# Patient Record
Sex: FEMALE | Race: WHITE | Employment: OTHER | URBAN - METROPOLITAN AREA
[De-identification: names, ages, dates, MRNs, and addresses within clinical notes are randomized per-mention and may not be internally consistent; named-entity substitution may affect disease eponyms.]

---

## 2018-08-07 ENCOUNTER — HOSPITAL ENCOUNTER (INPATIENT)
Age: 83
LOS: 1 days | Discharge: NURSING, REHAB OR LONG-TERM CARE | DRG: 641 | End: 2018-08-09
Attending: EMERGENCY MEDICINE | Admitting: INTERNAL MEDICINE
Payer: MEDICARE

## 2018-08-07 ENCOUNTER — APPOINTMENT (OUTPATIENT)
Dept: GENERAL RADIOLOGY | Age: 83
DRG: 641 | End: 2018-08-07
Payer: MEDICARE

## 2018-08-07 ENCOUNTER — APPOINTMENT (OUTPATIENT)
Dept: CT IMAGING | Age: 83
DRG: 641 | End: 2018-08-07
Payer: MEDICARE

## 2018-08-07 ENCOUNTER — APPOINTMENT (OUTPATIENT)
Dept: ULTRASOUND IMAGING | Age: 83
DRG: 641 | End: 2018-08-07
Payer: MEDICARE

## 2018-08-07 DIAGNOSIS — R41.82 ALTERED MENTAL STATUS, UNSPECIFIED ALTERED MENTAL STATUS TYPE: Primary | ICD-10-CM

## 2018-08-07 DIAGNOSIS — E86.0 DEHYDRATION: ICD-10-CM

## 2018-08-07 LAB
ALBUMIN SERPL-MCNC: 3 G/DL (ref 3.5–5.2)
ALP BLD-CCNC: 458 U/L (ref 35–104)
ALT SERPL-CCNC: 57 U/L (ref 0–32)
ANION GAP SERPL CALCULATED.3IONS-SCNC: 8 MMOL/L (ref 7–16)
AST SERPL-CCNC: 77 U/L (ref 0–31)
BASOPHILS ABSOLUTE: 0.03 E9/L (ref 0–0.2)
BASOPHILS RELATIVE PERCENT: 0.5 % (ref 0–2)
BILIRUB SERPL-MCNC: 0.8 MG/DL (ref 0–1.2)
BUN BLDV-MCNC: 27 MG/DL (ref 8–23)
CALCIUM SERPL-MCNC: 9.5 MG/DL (ref 8.6–10.2)
CHLORIDE BLD-SCNC: 104 MMOL/L (ref 98–107)
CO2: 28 MMOL/L (ref 22–29)
CREAT SERPL-MCNC: 0.5 MG/DL (ref 0.5–1)
EOSINOPHILS ABSOLUTE: 0.15 E9/L (ref 0.05–0.5)
EOSINOPHILS RELATIVE PERCENT: 2.7 % (ref 0–6)
GFR AFRICAN AMERICAN: >60
GFR NON-AFRICAN AMERICAN: >60 ML/MIN/1.73
GLUCOSE BLD-MCNC: 122 MG/DL (ref 74–109)
HCT VFR BLD CALC: 35 % (ref 34–48)
HEMOGLOBIN: 11.6 G/DL (ref 11.5–15.5)
IMMATURE GRANULOCYTES #: 0.03 E9/L
IMMATURE GRANULOCYTES %: 0.5 % (ref 0–5)
LYMPHOCYTES ABSOLUTE: 1.59 E9/L (ref 1.5–4)
LYMPHOCYTES RELATIVE PERCENT: 28.4 % (ref 20–42)
MAGNESIUM: 1.7 MG/DL (ref 1.6–2.6)
MCH RBC QN AUTO: 32.8 PG (ref 26–35)
MCHC RBC AUTO-ENTMCNC: 33.1 % (ref 32–34.5)
MCV RBC AUTO: 98.9 FL (ref 80–99.9)
MONOCYTES ABSOLUTE: 0.8 E9/L (ref 0.1–0.95)
MONOCYTES RELATIVE PERCENT: 14.3 % (ref 2–12)
NEUTROPHILS ABSOLUTE: 3 E9/L (ref 1.8–7.3)
NEUTROPHILS RELATIVE PERCENT: 53.6 % (ref 43–80)
PDW BLD-RTO: 13.5 FL (ref 11.5–15)
PLATELET # BLD: 118 E9/L (ref 130–450)
PMV BLD AUTO: 11 FL (ref 7–12)
POTASSIUM SERPL-SCNC: 4.2 MMOL/L (ref 3.5–5)
RBC # BLD: 3.54 E12/L (ref 3.5–5.5)
SODIUM BLD-SCNC: 140 MMOL/L (ref 132–146)
TOTAL PROTEIN: 6.7 G/DL (ref 6.4–8.3)
TROPONIN: <0.01 NG/ML (ref 0–0.03)
WBC # BLD: 5.6 E9/L (ref 4.5–11.5)

## 2018-08-07 PROCEDURE — 80053 COMPREHEN METABOLIC PANEL: CPT

## 2018-08-07 PROCEDURE — 76705 ECHO EXAM OF ABDOMEN: CPT

## 2018-08-07 PROCEDURE — 85025 COMPLETE CBC W/AUTO DIFF WBC: CPT

## 2018-08-07 PROCEDURE — 70450 CT HEAD/BRAIN W/O DYE: CPT

## 2018-08-07 PROCEDURE — 99285 EMERGENCY DEPT VISIT HI MDM: CPT

## 2018-08-07 PROCEDURE — 81001 URINALYSIS AUTO W/SCOPE: CPT

## 2018-08-07 PROCEDURE — 83735 ASSAY OF MAGNESIUM: CPT

## 2018-08-07 PROCEDURE — 6360000004 HC RX CONTRAST MEDICATION: Performed by: RADIOLOGY

## 2018-08-07 PROCEDURE — 84484 ASSAY OF TROPONIN QUANT: CPT

## 2018-08-07 PROCEDURE — 71045 X-RAY EXAM CHEST 1 VIEW: CPT

## 2018-08-07 PROCEDURE — 74177 CT ABD & PELVIS W/CONTRAST: CPT

## 2018-08-07 PROCEDURE — 2580000003 HC RX 258: Performed by: STUDENT IN AN ORGANIZED HEALTH CARE EDUCATION/TRAINING PROGRAM

## 2018-08-07 RX ORDER — 0.9 % SODIUM CHLORIDE 0.9 %
1000 INTRAVENOUS SOLUTION INTRAVENOUS ONCE
Status: COMPLETED | OUTPATIENT
Start: 2018-08-07 | End: 2018-08-07

## 2018-08-07 RX ADMIN — SODIUM CHLORIDE 1000 ML: 9 INJECTION, SOLUTION INTRAVENOUS at 22:10

## 2018-08-07 RX ADMIN — IOPAMIDOL 100 ML: 755 INJECTION, SOLUTION INTRAVENOUS at 23:25

## 2018-08-07 ASSESSMENT — ENCOUNTER SYMPTOMS
SORE THROAT: 0
DIARRHEA: 0
VISUAL CHANGE: 0
BACK PAIN: 0
EYE DEVIATION: 0
EYE REDNESS: 0
SHORTNESS OF BREATH: 0
TROUBLE SWALLOWING: 0
VOMITING: 0
COUGH: 0
ABDOMINAL PAIN: 0
DIFFICULTY BREATHING: 0
NAUSEA: 0

## 2018-08-08 PROBLEM — Z66 DNR (DO NOT RESUSCITATE): Status: ACTIVE | Noted: 2018-08-08

## 2018-08-08 PROBLEM — E86.0 DEHYDRATION: Status: ACTIVE | Noted: 2018-08-08

## 2018-08-08 PROBLEM — R74.8 BLOOD ALKALINE PHOSPHATASE INCREASED COMPARED WITH PRIOR MEASUREMENT: Status: ACTIVE | Noted: 2018-08-08

## 2018-08-08 PROBLEM — F03.90 DEMENTIA (HCC): Status: ACTIVE | Noted: 2018-08-08

## 2018-08-08 PROBLEM — R41.82 ALTERED MENTAL STATUS: Status: ACTIVE | Noted: 2018-08-08

## 2018-08-08 LAB
BACTERIA: ABNORMAL /HPF
BILIRUBIN URINE: NEGATIVE
BLOOD, URINE: ABNORMAL
CLARITY: CLEAR
COLOR: YELLOW
EKG ATRIAL RATE: 89 BPM
EKG P AXIS: 77 DEGREES
EKG P-R INTERVAL: 170 MS
EKG Q-T INTERVAL: 388 MS
EKG QRS DURATION: 82 MS
EKG QTC CALCULATION (BAZETT): 472 MS
EKG R AXIS: 65 DEGREES
EKG T AXIS: 75 DEGREES
EKG VENTRICULAR RATE: 89 BPM
EPITHELIAL CELLS, UA: ABNORMAL /HPF
GLUCOSE URINE: NEGATIVE MG/DL
KETONES, URINE: NEGATIVE MG/DL
LEUKOCYTE ESTERASE, URINE: NEGATIVE
NITRITE, URINE: NEGATIVE
PH UA: 7 (ref 5–9)
PROTEIN UA: NEGATIVE MG/DL
RBC UA: ABNORMAL /HPF (ref 0–2)
SPECIFIC GRAVITY UA: 1.01 (ref 1–1.03)
UROBILINOGEN, URINE: 0.2 E.U./DL
WBC UA: ABNORMAL /HPF (ref 0–5)

## 2018-08-08 PROCEDURE — G8979 MOBILITY GOAL STATUS: HCPCS

## 2018-08-08 PROCEDURE — G0378 HOSPITAL OBSERVATION PER HR: HCPCS

## 2018-08-08 PROCEDURE — 2580000003 HC RX 258: Performed by: STUDENT IN AN ORGANIZED HEALTH CARE EDUCATION/TRAINING PROGRAM

## 2018-08-08 PROCEDURE — 97161 PT EVAL LOW COMPLEX 20 MIN: CPT

## 2018-08-08 PROCEDURE — 96372 THER/PROPH/DIAG INJ SC/IM: CPT

## 2018-08-08 PROCEDURE — APPSS15 APP SPLIT SHARED TIME 0-15 MINUTES: Performed by: NURSE PRACTITIONER

## 2018-08-08 PROCEDURE — 97165 OT EVAL LOW COMPLEX 30 MIN: CPT

## 2018-08-08 PROCEDURE — G8988 SELF CARE GOAL STATUS: HCPCS

## 2018-08-08 PROCEDURE — G8978 MOBILITY CURRENT STATUS: HCPCS

## 2018-08-08 PROCEDURE — 99999 PR OFFICE/OUTPT VISIT,PROCEDURE ONLY: CPT | Performed by: INTERNAL MEDICINE

## 2018-08-08 PROCEDURE — 6360000002 HC RX W HCPCS: Performed by: INTERNAL MEDICINE

## 2018-08-08 PROCEDURE — 2580000003 HC RX 258: Performed by: INTERNAL MEDICINE

## 2018-08-08 PROCEDURE — 99220 PR INITIAL OBSERVATION CARE/DAY 70 MINUTES: CPT | Performed by: INTERNAL MEDICINE

## 2018-08-08 PROCEDURE — G8987 SELF CARE CURRENT STATUS: HCPCS

## 2018-08-08 PROCEDURE — 6370000000 HC RX 637 (ALT 250 FOR IP): Performed by: INTERNAL MEDICINE

## 2018-08-08 RX ORDER — POLYETHYLENE GLYCOL 3350 17 G/17G
17 POWDER, FOR SOLUTION ORAL DAILY PRN
COMMUNITY

## 2018-08-08 RX ORDER — CALCITONIN SALMON 200 [IU]/.09ML
1 SPRAY, METERED NASAL DAILY
Status: ON HOLD | COMMUNITY
End: 2018-08-09 | Stop reason: HOSPADM

## 2018-08-08 RX ORDER — BUDESONIDE AND FORMOTEROL FUMARATE DIHYDRATE 80; 4.5 UG/1; UG/1
2 AEROSOL RESPIRATORY (INHALATION) 2 TIMES DAILY
COMMUNITY

## 2018-08-08 RX ORDER — ESCITALOPRAM OXALATE 10 MG/1
10 TABLET ORAL DAILY
COMMUNITY

## 2018-08-08 RX ORDER — ONDANSETRON 2 MG/ML
4 INJECTION INTRAMUSCULAR; INTRAVENOUS EVERY 6 HOURS PRN
Status: DISCONTINUED | OUTPATIENT
Start: 2018-08-08 | End: 2018-08-09 | Stop reason: HOSPADM

## 2018-08-08 RX ORDER — IPRATROPIUM BROMIDE AND ALBUTEROL SULFATE 2.5; .5 MG/3ML; MG/3ML
1 SOLUTION RESPIRATORY (INHALATION) EVERY 6 HOURS PRN
Status: DISCONTINUED | OUTPATIENT
Start: 2018-08-08 | End: 2018-08-09 | Stop reason: HOSPADM

## 2018-08-08 RX ORDER — CALCITONIN SALMON 200 [IU]/.09ML
1 SPRAY, METERED NASAL DAILY
Status: DISCONTINUED | OUTPATIENT
Start: 2018-08-08 | End: 2018-08-08 | Stop reason: CLARIF

## 2018-08-08 RX ORDER — MULTIVIT-MIN/IRON FUM/FOLIC AC 7.5 MG-4
1 TABLET ORAL DAILY
COMMUNITY

## 2018-08-08 RX ORDER — IPRATROPIUM BROMIDE AND ALBUTEROL SULFATE 2.5; .5 MG/3ML; MG/3ML
1 SOLUTION RESPIRATORY (INHALATION) EVERY 6 HOURS PRN
COMMUNITY

## 2018-08-08 RX ORDER — OXYCODONE HYDROCHLORIDE 5 MG/1
5 TABLET ORAL EVERY 8 HOURS PRN
COMMUNITY

## 2018-08-08 RX ORDER — OXYCODONE HYDROCHLORIDE 5 MG/1
5 TABLET ORAL EVERY 6 HOURS PRN
Status: DISCONTINUED | OUTPATIENT
Start: 2018-08-08 | End: 2018-08-09 | Stop reason: HOSPADM

## 2018-08-08 RX ORDER — DOCUSATE SODIUM 100 MG/1
100 CAPSULE, LIQUID FILLED ORAL 2 TIMES DAILY
Status: DISCONTINUED | OUTPATIENT
Start: 2018-08-08 | End: 2018-08-09 | Stop reason: HOSPADM

## 2018-08-08 RX ORDER — SODIUM PHOSPHATE, DIBASIC AND SODIUM PHOSPHATE, MONOBASIC 7; 19 G/133ML; G/133ML
1 ENEMA RECTAL DAILY PRN
COMMUNITY

## 2018-08-08 RX ORDER — 0.9 % SODIUM CHLORIDE 0.9 %
30 INTRAVENOUS SOLUTION INTRAVENOUS ONCE
Status: COMPLETED | OUTPATIENT
Start: 2018-08-08 | End: 2018-08-08

## 2018-08-08 RX ORDER — BISACODYL 10 MG
10 SUPPOSITORY, RECTAL RECTAL DAILY PRN
COMMUNITY

## 2018-08-08 RX ORDER — SODIUM CHLORIDE 0.9 % (FLUSH) 0.9 %
10 SYRINGE (ML) INJECTION PRN
Status: DISCONTINUED | OUTPATIENT
Start: 2018-08-08 | End: 2018-08-09 | Stop reason: HOSPADM

## 2018-08-08 RX ORDER — SODIUM CHLORIDE 0.9 % (FLUSH) 0.9 %
10 SYRINGE (ML) INJECTION EVERY 12 HOURS SCHEDULED
Status: DISCONTINUED | OUTPATIENT
Start: 2018-08-08 | End: 2018-08-09 | Stop reason: HOSPADM

## 2018-08-08 RX ORDER — ACETAMINOPHEN 160 MG
1 TABLET,DISINTEGRATING ORAL DAILY
COMMUNITY

## 2018-08-08 RX ORDER — SPIRONOLACTONE 25 MG/1
25 TABLET ORAL DAILY
COMMUNITY

## 2018-08-08 RX ORDER — SPIRONOLACTONE 25 MG/1
25 TABLET ORAL DAILY
Status: DISCONTINUED | OUTPATIENT
Start: 2018-08-08 | End: 2018-08-08

## 2018-08-08 RX ORDER — CHOLECALCIFEROL (VITAMIN D3) 50 MCG
2000 TABLET ORAL DAILY
Status: DISCONTINUED | OUTPATIENT
Start: 2018-08-08 | End: 2018-08-09 | Stop reason: HOSPADM

## 2018-08-08 RX ORDER — ESCITALOPRAM OXALATE 10 MG/1
10 TABLET ORAL DAILY
Status: DISCONTINUED | OUTPATIENT
Start: 2018-08-08 | End: 2018-08-09 | Stop reason: HOSPADM

## 2018-08-08 RX ORDER — PROPRANOLOL HYDROCHLORIDE 20 MG/1
20 TABLET ORAL 2 TIMES DAILY
Status: DISCONTINUED | OUTPATIENT
Start: 2018-08-08 | End: 2018-08-09 | Stop reason: HOSPADM

## 2018-08-08 RX ORDER — PROPRANOLOL HYDROCHLORIDE 20 MG/1
20 TABLET ORAL 2 TIMES DAILY
COMMUNITY

## 2018-08-08 RX ORDER — SODIUM CHLORIDE 9 MG/ML
INJECTION, SOLUTION INTRAVENOUS CONTINUOUS
Status: DISCONTINUED | OUTPATIENT
Start: 2018-08-08 | End: 2018-08-09

## 2018-08-08 RX ADMIN — DOCUSATE SODIUM 100 MG: 100 CAPSULE, LIQUID FILLED ORAL at 20:10

## 2018-08-08 RX ADMIN — CHOLECALCIFEROL TAB 50 MCG (2000 UNIT) 2000 UNITS: 50 TAB at 09:10

## 2018-08-08 RX ADMIN — ESCITALOPRAM OXALATE 10 MG: 10 TABLET ORAL at 09:10

## 2018-08-08 RX ADMIN — ENOXAPARIN SODIUM 30 MG: 30 INJECTION SUBCUTANEOUS at 09:10

## 2018-08-08 RX ADMIN — SODIUM CHLORIDE 1000 ML: 9 INJECTION, SOLUTION INTRAVENOUS at 00:53

## 2018-08-08 RX ADMIN — DOCUSATE SODIUM 100 MG: 100 CAPSULE, LIQUID FILLED ORAL at 09:10

## 2018-08-08 RX ADMIN — PROPRANOLOL HYDROCHLORIDE 20 MG: 20 TABLET ORAL at 20:10

## 2018-08-08 RX ADMIN — PROPRANOLOL HYDROCHLORIDE 20 MG: 20 TABLET ORAL at 09:10

## 2018-08-08 RX ADMIN — SODIUM CHLORIDE: 9 INJECTION, SOLUTION INTRAVENOUS at 02:44

## 2018-08-08 RX ADMIN — SODIUM CHLORIDE: 9 INJECTION, SOLUTION INTRAVENOUS at 22:34

## 2018-08-08 RX ADMIN — SODIUM CHLORIDE: 9 INJECTION, SOLUTION INTRAVENOUS at 10:55

## 2018-08-08 RX ADMIN — SPIRONOLACTONE 25 MG: 25 TABLET ORAL at 09:10

## 2018-08-08 ASSESSMENT — PAIN SCALES - GENERAL
PAINLEVEL_OUTOF10: 0

## 2018-08-08 NOTE — CARE COORDINATION
Social Work discharge planning  SW consult for discharge planning noted. SW spoke to pt's , who advised pt was in rehab and now has home care. However,  was not able to tell Sw name of SNF, nor [place] [id number] agency after he looked around home for papers he said. SW called son Vick Judd [phone]. Vick Judd advised pt lives with her 81 yo , who has \"some memory issues at times, but her's is worse\". She uses a wheeled walker at home and bed side commode. Vick Judd also said she has a nebulizer from past use, but was not using it at home presently. Vick Judd advised pt was at [facility] until Friday 8/4/18, where pt's granddaughter Kate Lipscomb is the . Vick Villatoromax advised pt now has [facility] [place] ph [phone] fax [phone]. Vick Judd said goal is to return home with [place] [address] if able, but [facility] again if snf needed. MARIAMA spoke to Cadence Mcmillan with [facility] [place] and she asked for pt's AVS and resume hhc be faxed to them at discharge. PLAN:  Await PT OT evals here today to help determine return to SNF vs return to home with resumption of hhc.  Electronically signed by JEFF Nicole on 8/8/2018 at 8:57 AM     Addendum-   Chart checked for pt ot, but not available in Epic at this time. Will continue to follow with CM for discharge planning.   Electronically signed by Estil Dance on 8/8/2018 at 12:07 PM

## 2018-08-08 NOTE — PATIENT CARE CONFERENCE
[facility] Quality Flow/Interdisciplinary Rounds Progress Note        Quality Flow Rounds held on August 8, 2018    Disciplines Attending:  Bedside Nurse,  and Nursing Unit Leadership    Beni Harris was admitted on 8/7/2018  9:13 PM    Anticipated Discharge Date:  Expected Discharge Date: 08/11/18    Disposition:    Rajan Score:  Rajan Scale Score: 19    Readmission Risk              Risk of Unplanned Readmission:        13             Discussed patient goal for the day, patient clinical progression, and barriers to discharge.   The following Goal(s) of the Day/Commitment(s) have been identified:  Monitor labs ,intake, output, and neurological status, increase activity, discharge planning      Leroy Gonzales  August 8, 2018

## 2018-08-08 NOTE — ED PROVIDER NOTES
[facility] COPD (chronic obstructive pulmonary disease) ([facility] and Hypertension. Past Surgical History:  has no past surgical history on file. Social History:  reports that she has never smoked. She has never used smokeless tobacco. She reports that she does not drink alcohol or use drugs. Family History: family history is not on file. The patients home medications have been reviewed. Allergies: Patient has no known allergies.     -------------------------------------------------- RESULTS -------------------------------------------------    LABS:  Results for orders placed or performed during the hospital encounter of 08/07/18   CBC auto differential   Result Value Ref Range    WBC 5.6 4.5 - 11.5 E9/L    RBC 3.54 3.50 - 5.50 E12/L    Hemoglobin 11.6 11.5 - 15.5 g/dL    Hematocrit 35.0 34.0 - 48.0 %    MCV 98.9 80.0 - 99.9 fL    MCH 32.8 26.0 - 35.0 pg    MCHC 33.1 32.0 - 34.5 %    RDW 13.5 11.5 - 15.0 fL    Platelets 534 (L) 817 - 450 E9/L    MPV 11.0 7.0 - 12.0 fL    Neutrophils % 53.6 43.0 - 80.0 %    Immature Granulocytes % 0.5 0.0 - 5.0 %    Lymphocytes % 28.4 20.0 - 42.0 %    Monocytes % 14.3 (H) 2.0 - 12.0 %    Eosinophils % 2.7 0.0 - 6.0 %    Basophils % 0.5 0.0 - 2.0 %    Neutrophils # 3.00 1.80 - 7.30 E9/L    Immature Granulocytes # 0.03 E9/L    Lymphocytes # 1.59 1.50 - 4.00 E9/L    Monocytes # 0.80 0.10 - 0.95 E9/L    Eosinophils # 0.15 0.05 - 0.50 E9/L    Basophils # 0.03 0.00 - 0.20 E9/L   Comprehensive Metabolic Panel   Result Value Ref Range    Sodium 140 132 - 146 mmol/L    Potassium 4.2 3.5 - 5.0 mmol/L    Chloride 104 98 - 107 mmol/L    CO2 28 22 - 29 mmol/L    Anion Gap 8 7 - 16 mmol/L    Glucose 122 (H) 74 - 109 mg/dL    BUN 27 (H) 8 - 23 mg/dL    CREATININE 0.5 0.5 - 1.0 mg/dL    GFR Non-African American >60 >=60 mL/min/1.73    GFR African American >60     Calcium 9.5 8.6 - 10.2 mg/dL    Total Protein 6.7 6.4 - 8.3 g/dL    Alb 3.0 (L) 3.5 - 5.2 g/dL    Total Bilirubin 0.8 0.0 - 1.2 ------------------------- NURSING NOTES AND VITALS REVIEWED ---------------------------  Date / Time Roomed:  8/7/2018  9:13 PM  ED Bed Assignment:  25/25    The nursing notes within the ED encounter and vital signs as below have been reviewed. Patient Vitals for the past 24 hrs:   BP Temp Temp src Pulse Resp SpO2 Height Weight   08/08/18 0052 130/61 97.9 °F (36.6 °C) Oral 87 16 94 % - -   08/07/18 2300 (!) 148/67 98.5 °F (36.9 °C) Oral 82 14 92 % - -   08/07/18 2120 (!) 167/69 98.5 °F (36.9 °C) Oral 82 14 92 % 4' 11\" (1.499 m) 89 lb 12.8 oz (40.7 kg)       Oxygen Saturation Interpretation: Normal    ------------------------------------------ PROGRESS NOTES   Counseling:  I have spoken with the patient and discussed todays results, in addition to providing specific details for the plan of care and counseling regarding the diagnosis and prognosis. Their questions are answered at this time and they are agreeable with the plan of admission.    --------------------------------- ADDITIONAL PROVIDER NOTES ---------------------------------  Consultations:  Time: 0020. Spoke with Dr. Pop Szymanski. Discussed case. They will admit the patient. This patient's ED course included: a personal history and physicial examination, re-evaluation prior to disposition, multiple bedside re-evaluations, IV medications, cardiac monitoring, continuous pulse oximetry and complex medical decision making and emergency management    This patient has remained hemodynamically stable during their ED course. Diagnosis:  1. Altered mental status, unspecified altered mental status type    2. Dehydration        Disposition:  Patient's disposition: Admit to [facility]/surg floor  Patient's condition is stable.          Kelly Edward DO  Resident  08/08/18 105

## 2018-08-08 NOTE — H&P
[facility] Hospitalist Group History and Physical      Chief Complaint:  ?AMS  History of Present Illness   The patient is a 80 y.o. female      Per ER report:  \" Patient's  reports that he was cleaning dishes from dinner, and walked back into the living room and noticed that his wife was slouched over on the couch and twitching of her upper extremities; however, she was able to respond him and her eyes were open during the episode. She is never had episodes like this before. He called his son who came over, and when the son got there he noticed agonal respirations and that she was gasping for air. They called EMS, and when EMS arrived she was having increased shallow breathing, however they gave her fluids and she seemed to improve. However she is still confused and not her baseline. She does not have any complaints, and denies any chest pain, shortness of breath, fever, chills, recent illness hematuria, dysuria, or other acute symptoms. She has been in rehab after a fall in femur fracture, and she just got released on Friday. \"    ER requesting us to bring in observation --\"believes she is dehdyrated and just needs IV fluids, plan to return home -- US pending\"    Noted DNR CC status,  genna doesn't know where she is at --or why she is here. Denies pain  States she is eating well--feeds her self, uses walker to get around--she denies any complaintsbut [place]    - hx taken from the ER records,  Friend who accompanied NOT in room, patient seems to have dementia? AO x1  REVIEW OF SYSTEMS:  Limited? Constitutional: No fever /chills unless otherwise discussed above  Vision: No new sudden loss of vision,   ENT:  no sore throat,unless otherwise discussed above  Respiratory: No hemoptosis  Or NEW inc sputum unless otherwise discussed above-- But she can't hear well  Cardiology: no pleuritic chest pain No new angina unless otherwise discussed above  Gastroenterology:  No blood in stool.     Genitourinary: No exposure control; mA and/or kV adjustment per patient size (includes targeted exams where dose is matched to clinical indication); or iterative reconstruction. Coronal and sagittal reformatted images were created and reviewed. COMPARISON:   No relevant prior studies available. FINDINGS:   Brain:  Decreased attenuation of the supratentorial white matter is likely secondary to chronic microvascular ischemia. No hemorrhage. Ventricles:  Ventricular and subarachnoid spaces are age appropriate. Bones/joints:  Unremarkable. No acute fracture. Soft tissues:  Unremarkable. Vasculature:  Intracranial vascular calcification. Sinuses:  Unremarkable as visualized. No acute sinusitis. Mastoid air cells:  Unremarkable as visualized. No mastoid effusion. No acute intracranial abnormality. This report has been electronically signed by Basilio Hamilton MD.    Ct Abdomen Pelvis W Iv Contrast Additional Contrast? None    Result Date: 8/7/2018  EXAM:   CT Abdomen and Pelvis With Intravenous Contrast EXAM DATE/TIME:   8/7/2018 10:00 PM CLINICAL HISTORY:   80years old, female; Pain; Abdominal pain TECHNIQUE:   Axial computed tomography images of the abdomen and pelvis with intravenous contrast.  All CT scans at this facility use at least one of these dose optimization techniques: automated exposure control; mA and/or kV adjustment per patient size (includes targeted exams where dose is matched to clinical indication); or iterative reconstruction. Coronal and sagittal reformatted images were created and reviewed. CONTRAST:   100 mL of [id number] administered intravenously. COMPARISON:   No relevant prior studies available. FINDINGS:   Artifacts:  Patient motion. Lung bases:  See below. Pleural space:  Small left-sided pleural effusion with adjacent atelectasis. ABDOMEN:   Liver:  Nodular hepatic margins compatible with chronic hepatic disease. There are several calcified granulomas involving the liver. noticed agonal respirations and that she was gasping for air. They called EMS, and when EMS arrived she was having increased shallow breathing, however they gave her fluids and she seemed to improve. However she is still confused and not her baseline. She does not have any complaints, and denies any chest pain, shortness of breath, fever, chills, recent illness hematuria, dysuria, or other acute symptoms. She has been in rehab after a fall in femur fracture, and she just got released on Friday. \"    ER requesting us to bring in observation --\"believes she is dehdyrated and just needs IV fluids, plan to return home -- US pending\"    Noted DNR CC status,  genna doesn't know where she is at --or why she is here. Denies pain  States she is eating well--feeds her self, uses walker to get around--she denies any complaintsbut [place]    - hx taken from the ER records,  Friend who accompanied NOT in room, patient seems to have dementia? Dry mucus membranes, continue IV fluids, observation for recurrence of LOC/twitching -- rule out seizure or myoclonic jerks if syncope  Check orthostatics in am--already given fluids in ER.  PT/OT    Inc alk phos- US didn't show acute changes-- ?bone from healing hip fracture ? pagets  But also noted elevated alt/ast-- defer extensive work up - DNR CCA status    Observation without abx for now-- UA negative other than some blood, cxr N-- wbc 5.6 , afebrile  CT head without acute changes  +dementia - multi infarct or other etiology  Will need home meds list, not available  PT/OT home needs,  ?failing at home, just [facility] home from facility    Cristiane Mckeon MD   Night Officer, overnight admitting doctor at [place] [address] for [facility]  If Qs please call [phone]  Electronically signed by Carol Dietz MD on 8/8/2018 at 1:45 AM

## 2018-08-08 NOTE — ED NOTES
Assumed care of pt. No s/s of distress. Resps easy. Alert but confused. Cardiac/hemodynamic monitoring in place.  for EMS. 20G IV noted to R wrist started by EMS. EMS states pt is DNR-CC and papers should be faxed to hospital from nursing home. Multiple visitors in room.      Miriam Holbrook RN  08/07/18 [id number]

## 2018-08-09 VITALS
HEART RATE: 67 BPM | BODY MASS INDEX: 17.78 KG/M2 | DIASTOLIC BLOOD PRESSURE: 52 MMHG | TEMPERATURE: 97.8 F | OXYGEN SATURATION: 94 % | SYSTOLIC BLOOD PRESSURE: 150 MMHG | HEIGHT: 59 IN | WEIGHT: 88.2 LBS | RESPIRATION RATE: 18 BRPM

## 2018-08-09 PROCEDURE — 6360000002 HC RX W HCPCS: Performed by: INTERNAL MEDICINE

## 2018-08-09 PROCEDURE — G0378 HOSPITAL OBSERVATION PER HR: HCPCS

## 2018-08-09 PROCEDURE — 96372 THER/PROPH/DIAG INJ SC/IM: CPT

## 2018-08-09 PROCEDURE — 6370000000 HC RX 637 (ALT 250 FOR IP): Performed by: INTERNAL MEDICINE

## 2018-08-09 PROCEDURE — 94640 AIRWAY INHALATION TREATMENT: CPT

## 2018-08-09 PROCEDURE — 97530 THERAPEUTIC ACTIVITIES: CPT

## 2018-08-09 PROCEDURE — 2700000000 HC OXYGEN THERAPY PER DAY

## 2018-08-09 PROCEDURE — 99239 HOSP IP/OBS DSCHRG MGMT >30: CPT | Performed by: INTERNAL MEDICINE

## 2018-08-09 PROCEDURE — 94664 DEMO&/EVAL PT USE INHALER: CPT

## 2018-08-09 PROCEDURE — APPSS45 APP SPLIT SHARED TIME 31-45 MINUTES: Performed by: NURSE PRACTITIONER

## 2018-08-09 PROCEDURE — 1200000000 HC SEMI PRIVATE

## 2018-08-09 RX ADMIN — ENOXAPARIN SODIUM 30 MG: 30 INJECTION SUBCUTANEOUS at 09:20

## 2018-08-09 RX ADMIN — DOCUSATE SODIUM 100 MG: 100 CAPSULE, LIQUID FILLED ORAL at 09:20

## 2018-08-09 RX ADMIN — IPRATROPIUM BROMIDE AND ALBUTEROL SULFATE 3 ML: .5; 3 SOLUTION RESPIRATORY (INHALATION) at 03:27

## 2018-08-09 RX ADMIN — ESCITALOPRAM OXALATE 10 MG: 10 TABLET ORAL at 09:20

## 2018-08-09 RX ADMIN — CHOLECALCIFEROL TAB 50 MCG (2000 UNIT) 2000 UNITS: 50 TAB at 09:20

## 2018-08-09 RX ADMIN — PROPRANOLOL HYDROCHLORIDE 20 MG: 20 TABLET ORAL at 09:20

## 2018-08-09 RX ADMIN — MOMETASONE FUROATE AND FORMOTEROL FUMARATE DIHYDRATE 2 PUFF: 100; 5 AEROSOL RESPIRATORY (INHALATION) at 11:52

## 2018-08-09 ASSESSMENT — PAIN SCALES - GENERAL
PAINLEVEL_OUTOF10: 0
PAINLEVEL_OUTOF10: 0

## 2018-08-09 NOTE — PATIENT CARE CONFERENCE
[facility] Quality Flow/Interdisciplinary Rounds Progress Note        Quality Flow Rounds held on August 9, 2018    Disciplines Attending:  Bedside Nurse, ,  and Nursing Unit Lele Miguel See was admitted on 8/7/2018  9:13 PM    Anticipated Discharge Date:  Expected Discharge Date: 08/11/18    Disposition:    Rajan Score:  Rajan Scale Score: 17    Readmission Risk              Risk of Unplanned Readmission:        13             Discussed patient goal for the day, patient clinical progression, and barriers to discharge.   The following Goal(s) of the Day/Commitment(s) have been identified:  Monitor labs, intake, and output, surgery consult, discharge planning      Sherman Steel  August 9, 2018

## 2018-08-09 NOTE — CARE COORDINATION
CASE MANAGEMENT. .. Met with patient and her family along with SS. I introduced myself and role as CM. Patient is confused. SS, the family and I discussed plan of care and discharge plan to [facility]. All questions answered. SS arranging transport for today to [facility].

## 2018-08-09 NOTE — PROGRESS NOTES
End of shift chart check complete.
Physical Therapy  Facility/Department: [address] INTERMEDIATE 1  Daily Treatment Note  NAME: Adithya Pike See  : 10/3/1931  MRN: [id number]    Date of Service: 2018    Patient Diagnosis(es): The primary encounter diagnosis was Altered mental status, unspecified altered mental status type. A diagnosis of Dehydration was also pertinent to this visit. has a past medical history of Cancer [facility]); COPD (chronic obstructive pulmonary disease) ([facility] and Hypertension. has no past surgical history on file. Evaluating Therapist: Irene Bond PT         Room #: 430    DIAGNOSIS: AMS      PRECAUTIONS: falls      Social:  Pt unable to report  Prior to admission: pt unable to reports        Initial Evaluation  Date:  18 Treatment     18 Short Term/ Long Term   Goals   Was pt agreeable to Eval/treatment? yes  yes      Does pt have pain?  denies  No c/o pain      Bed Mobility  Rolling:  NT   Supine to sit:  SBA   Sit to supine:  NT   Scooting:  SBA    NT, pt just up to chair with nursing staff  supervision    Transfers Sit to stand: SBA   Stand to sit:  SBA   Stand pivot:  SBA    min assist.  Loss of balance to left upon standing  supervision    Ambulation     100 feet with  ww  with  SBA   100 feet with ww min to mod assist 150  feet with  ww  with  Supervision          Stair negotiation: ascended and descended Nt    NT  4  steps with  1 rail with CGA    LE ROM  WFL        LE strength  3+ to 4-/ 5        AM- PAC RAW score          Pt is alert, confused. Difficulty following directions    Patient education  Pt was educated on walker safety    Patient response to education:   Pt verbalized understanding Pt demonstrated skill Pt requires further education in this area   no no yes     Additional Comments: Pt with loss of balance to left upon standing. Also during ambulation pt let go of walker to touch picture on wall. Loss of balance to left with mod assist to prevent fall.   Pt requires assist to
potential is Good for reaching above PT goals. Pts/ family goals   1. None stated     Patient and or family understand(s) diagnosis, prognosis, and plan of care. -  no    PLAN  PT care will be provided in accordance with the objectives noted above. Whenever appropriate, clear delegation orders will be provided for nursing staff. Exercises and functional mobility practice will be used as well as appropriate assistive devices or modalities to obtain goals. Patient and family education will also be administered as needed. Frequency of treatments will be 2-3 x/week x  5 days.     Time in:  1117  Time out:  [address] number:  PT [id number]

## 2018-08-09 NOTE — DISCHARGE SUMMARY
control; mA and/or kV adjustment per patient size (includes targeted exams where dose is matched to clinical indication); or iterative reconstruction. Coronal and sagittal reformatted images were created and reviewed. CONTRAST:   100 mL of [id number] administered intravenously. COMPARISON:   No relevant prior studies available. FINDINGS:   Artifacts:  Patient motion. Lung bases:  See below. Pleural space:  Small left-sided pleural effusion with adjacent atelectasis. ABDOMEN:   Liver:  Nodular hepatic margins compatible with chronic hepatic disease. There are several calcified granulomas involving the liver. Gallbladder and bile ducts:  Previous cholecystectomy with mild intrahepatic biliary dilatation. Pancreas:  Pancreas is atrophic. Cystic lesion at the pancreatic tail measures 2 cm. Cystic lesion at the pancreatic body measures 12 mm. No ductal dilation. Spleen:  Unremarkable. No splenomegaly. Adrenals:  Unremarkable. No mass. Kidneys and ureters:  2 cm left renal hypodensity, probable cyst.  1.6 cm right renal cyst.  No hydronephrosis. Stomach and bowel:  Colonic diverticulosis without diverticulitis. No obstruction. PELVIS:   Appendix:  No findings to suggest acute appendicitis. Bladder:  Unremarkable. No mass. Reproductive:  Uterus is either atrophic or resected. ABDOMEN and PELVIS:   Intraperitoneal space:  Unremarkable. No free air. No significant fluid collection. Bones/joints: There are degenerative changes involving the spine. Chronic right pelvic fractures. Chronic appearing compression fractures involving T12, L1, L3 and L4. No dislocation. Soft tissues:  Unremarkable. Vasculature:  Arterial vascular calcification. No abdominal aortic aneurysm. Lymph nodes:  Unremarkable. No enlarged lymph nodes. Other findings: There are right abdominal metallic clips. No acute abnormality involving the abdomen or pelvis.    2 cm cystic lesion involving the pancreatic tail and 1.2 cm cystic lesion involving the pancreatic body. ACR White Paper guidelines [employer], et al. Luis Fernando Postin 2010; 5(53):992-99) suggest further evaluation with pancreas-dedicated MRI with MRCP if not already performed. This report has been electronically signed by Suyapa Cheung MD.    Us Gallbladder Ruq    Result Date: 2018  Patient MRN:  [id number] : 10/3/1931 Age: 80 years Gender: Female Order Date:  2018 11:15 PM TECHNIQUE/NUMBER OF IMAGES/COMPARISON/CLINICAL HISTORY: Gallbladder ultrasound 13 images Grayscale/color Doppler real-time ultrasound Reviewed the CT abdomen and pelvis of . Abdominal pain. FINDINGS: Please see report of the CT abdomen and pelvis of . There is no demonstration of a gallbladder by ultrasound. This correlates with cholecystectomy. The biliary tree is not dilated. CBD measures less than 6 mm. Status post cholecystectomy. No dilatation of the biliary tree. Xr Chest 1 Vw    Result Date: 2018  Patient MRN:  [id number] : 10/3/1931 Age: 80 years Gender: Female Order Date:  2018 10:00 PM TECHNIQUE/NUMBER OF IMAGES/COMPARISON/CLINICAL HISTORY: Chest AP 1 image one view. History chest pain. Syncope episodes. FINDINGS: Lungs are well expanded. Can represent some degree of interstitial changes. Cardiac has upper normal size. CTR: 14.3/34.7 cm. No infiltrates, consolidations or pleural effusions are seen. There is no perihilar vascular congestion     No acute cardiopulmonary process.       Patient Instructions:   Current Discharge Medication List      CONTINUE these medications which have NOT CHANGED    Details   spironolactone (ALDACTONE) 25 MG tablet Take 25 mg by mouth daily      bisacodyl (DULCOLAX) 10 MG suppository Place 10 mg rectally daily as needed for Constipation      Sodium Phosphates (FLEET) 7-19 GM/118ML Place 1 enema rectally daily as needed (constipation)      ipratropium-albuterol (DUONEB) 0.5-2.5 (3) MG/3ML SOLN nebulizer solution

## 2018-09-07 PROBLEM — E86.0 DEHYDRATION: Status: RESOLVED | Noted: 2018-08-08 | Resolved: 2018-09-07
